# Patient Record
Sex: MALE | Race: WHITE | NOT HISPANIC OR LATINO | URBAN - METROPOLITAN AREA
[De-identification: names, ages, dates, MRNs, and addresses within clinical notes are randomized per-mention and may not be internally consistent; named-entity substitution may affect disease eponyms.]

---

## 2018-11-09 ENCOUNTER — OFFICE (OUTPATIENT)
Dept: URBAN - METROPOLITAN AREA CLINIC 101 | Facility: CLINIC | Age: 53
End: 2018-11-09
Payer: COMMERCIAL

## 2018-11-09 VITALS
WEIGHT: 315 LBS | TEMPERATURE: 98.4 F | DIASTOLIC BLOOD PRESSURE: 96 MMHG | HEIGHT: 72 IN | SYSTOLIC BLOOD PRESSURE: 164 MMHG | HEART RATE: 69 BPM

## 2018-11-09 DIAGNOSIS — R19.5 OTHER FECAL ABNORMALITIES: ICD-10-CM

## 2018-11-09 DIAGNOSIS — R19.7 DIARRHEA, UNSPECIFIED: ICD-10-CM

## 2018-11-09 LAB
CELIAC DISEASE COMPREHENSIVE: DEAMIDATED GLIADIN ABS, IGA: 3 UNITS (ref 0–19)
CELIAC DISEASE COMPREHENSIVE: DEAMIDATED GLIADIN ABS, IGG: 3 UNITS (ref 0–19)
CELIAC DISEASE COMPREHENSIVE: ENDOMYSIAL ANTIBODY IGA: NEGATIVE
CELIAC DISEASE COMPREHENSIVE: IMMUNOGLOBULIN A, QN, SERUM: 96 MG/DL (ref 90–386)
CELIAC DISEASE COMPREHENSIVE: T-TRANSGLUTAMINASE (TTG) IGA: <2 U/ML
CELIAC DISEASE COMPREHENSIVE: T-TRANSGLUTAMINASE (TTG) IGG: <2 U/ML
TSH: 3.64 UIU/ML (ref 0.45–4.5)

## 2018-11-09 PROCEDURE — 99244 OFF/OP CNSLTJ NEW/EST MOD 40: CPT

## 2018-11-09 NOTE — SERVICEHPINOTES
ALFREDO WILKINS   is a   53   year old male who is being seen in consultation at the request of   MISSAEL VÁSQUEZ   for diarrhea. Seen by pcp and [facility] for diarrhea. BRStool studies were done and it was negative for H pylori, stool culture, ova and parasites and C-diff. BRReports diarrhea with mucus. + accidents with passing gas. This has been going on since June. BRReports having diarrhea up to 15 time a day on BSS type 6-7, mostly 7. In the morning and evening are the worst. + nocturnal diarrhea x 3. Denies blood in stool, melena or abdominal pain. Has a lot of gurgling sounds. Denies weight loss. BRThere is no correlation with food intake. The diarrhea occurs repeatedly every 15 min in the morning then it gets better during the day. Then it starts from evening to night up to 4-5 times and then in the middle of the night at least 3 times. Never had issues with diarrhea in the past. Nothing makes it better.Denies nausea, vomiting, dysphagia, acid reflux, dyspepsia or early satiety.  BRNot taking any medications currently. BRImodium did not help. BRWife had similar symptoms in June,  which now has resolved. BRDenies recent antibiotic use, sick person contact or travel. Denies family history of colon cancer, IBD or celiac disease. Never had a colonoscopy in the past. Denies taking NSAIDs. Denies chest pain, palpitations or sob. BRNo history of diabetes. Last seen by pcp in June 2018. BRFONT style="BACKGROUND-COLOR: #ffffcc" visited="true"BR/FONT

## 2018-11-19 ENCOUNTER — ON CAMPUS - OUTPATIENT (OUTPATIENT)
Dept: URBAN - METROPOLITAN AREA HOSPITAL 35 | Facility: HOSPITAL | Age: 53
End: 2018-11-19
Payer: COMMERCIAL

## 2018-11-19 DIAGNOSIS — R19.7 DIARRHEA, UNSPECIFIED: ICD-10-CM

## 2018-11-19 DIAGNOSIS — D12.3 BENIGN NEOPLASM OF TRANSVERSE COLON: ICD-10-CM

## 2018-11-19 PROCEDURE — 45380 COLONOSCOPY AND BIOPSY: CPT | Mod: 59

## 2018-11-19 PROCEDURE — 45385 COLONOSCOPY W/LESION REMOVAL: CPT

## 2019-02-22 ENCOUNTER — OFFICE (OUTPATIENT)
Dept: URBAN - METROPOLITAN AREA CLINIC 101 | Facility: CLINIC | Age: 54
End: 2019-02-22

## 2019-02-22 VITALS
HEIGHT: 72 IN | SYSTOLIC BLOOD PRESSURE: 138 MMHG | HEART RATE: 92 BPM | DIASTOLIC BLOOD PRESSURE: 106 MMHG | TEMPERATURE: 97.5 F | WEIGHT: 315 LBS

## 2019-02-22 DIAGNOSIS — R19.5 OTHER FECAL ABNORMALITIES: ICD-10-CM

## 2019-02-22 DIAGNOSIS — K52.89 OTHER SPECIFIED NONINFECTIVE GASTROENTERITIS AND COLITIS: ICD-10-CM

## 2019-02-22 PROCEDURE — 99214 OFFICE O/P EST MOD 30 MIN: CPT

## 2019-02-22 RX ORDER — DIPHENOXYLATE HYDROCHLORIDE AND ATROPINE SULFATE 2.5; .025 MG/1; MG/1
TABLET ORAL
Qty: 120 | Refills: 1 | Status: COMPLETED
Start: 2019-02-22 | End: 2019-04-11

## 2019-02-22 NOTE — SERVICEHPINOTES
ALFREDO WILKINS   is a   53   male who complains of ongoing diarrhea. Colonoscopy--TA x2, biopsies negative for colitis recall in 6 months due to poor prep. Negative stool studies and celiac panel. Symptoms are the same. Diarrhea since June. BMs are 15x/day worse in the mornings and evenings. Does not matter if he eats. Increased borborygmi. BSS type 7. Very rare for him to have solid BM. Occasional nocturnal BMs. No weight loss, fever, abdominal pain. He tried avoiding all dairy products for 2 weeks which made no difference. Xifaxan rx given to him after colonoscopy did not improve symptoms. He has not tried taking probiotics. Drinks 3 diet mountain dews per day. Gallbladder in situ. He has tried taking Imodium which does not help.

## 2019-04-11 ENCOUNTER — OFFICE (OUTPATIENT)
Dept: URBAN - METROPOLITAN AREA CLINIC 101 | Facility: CLINIC | Age: 54
End: 2019-04-11

## 2019-04-11 VITALS
HEART RATE: 54 BPM | WEIGHT: 315 LBS | DIASTOLIC BLOOD PRESSURE: 94 MMHG | TEMPERATURE: 97.3 F | SYSTOLIC BLOOD PRESSURE: 147 MMHG | HEIGHT: 72 IN

## 2019-04-11 DIAGNOSIS — Z86.010 PERSONAL HISTORY OF COLONIC POLYPS: ICD-10-CM

## 2019-04-11 DIAGNOSIS — Z53.8 PROCEDURE AND TREATMENT NOT CARRIED OUT FOR OTHER REASONS: ICD-10-CM

## 2019-04-11 DIAGNOSIS — K52.9 NONINFECTIVE GASTROENTERITIS AND COLITIS, UNSPECIFIED: ICD-10-CM

## 2019-04-11 PROCEDURE — 99214 OFFICE O/P EST MOD 30 MIN: CPT

## 2019-04-11 RX ORDER — ELUXADOLINE 75 MG/1
TABLET, FILM COATED ORAL
Qty: 60 | Refills: 2 | Status: ACTIVE
Start: 2019-04-11

## 2019-04-11 NOTE — SERVICEHPINOTES
ALFREDO WILKINS   is a   53   male who presents for f/u from [facility]. [facility] unremarkable aside from fatty liver.BRColonoscopy 11/2018--TA x2, biopsies negative for colitis recall in 6 months due to poor prep. Negative stool studies and celiac panel. He has stopped all diet sodas and tried probiotics with no improvement in sx. Diarrhea since June. BMs are 15x/day worse in the mornings and evenings. Does not matter if he eats. Increased borborygmi. BSS type 7. Very rare for him to have solid BM. He has not lost any weight. He still has his gallbladder.